# Patient Record
Sex: MALE | Employment: OTHER | URBAN - METROPOLITAN AREA
[De-identification: names, ages, dates, MRNs, and addresses within clinical notes are randomized per-mention and may not be internally consistent; named-entity substitution may affect disease eponyms.]

---

## 2022-12-21 ENCOUNTER — LAB REQUISITION (OUTPATIENT)
Dept: LAB | Facility: CLINIC | Age: 82
End: 2022-12-21

## 2022-12-21 VITALS
TEMPERATURE: 97.6 F | DIASTOLIC BLOOD PRESSURE: 80 MMHG | SYSTOLIC BLOOD PRESSURE: 126 MMHG | HEART RATE: 87 BPM | RESPIRATION RATE: 18 BRPM | OXYGEN SATURATION: 99 %

## 2022-12-21 DIAGNOSIS — Z00.01 ENCOUNTER FOR GENERAL ADULT MEDICAL EXAMINATION WITH ABNORMAL FINDINGS: ICD-10-CM

## 2022-12-21 RX ORDER — MEMANTINE HYDROCHLORIDE 5 MG/1
5 TABLET ORAL 2 TIMES DAILY
COMMUNITY

## 2022-12-21 RX ORDER — LEVOTHYROXINE SODIUM 50 UG/1
50 TABLET ORAL DAILY
COMMUNITY

## 2022-12-21 RX ORDER — METOPROLOL SUCCINATE 25 MG/1
12.5 TABLET, EXTENDED RELEASE ORAL DAILY
COMMUNITY

## 2022-12-21 RX ORDER — FAMOTIDINE 20 MG/1
20 TABLET, FILM COATED ORAL 2 TIMES DAILY
COMMUNITY

## 2022-12-21 RX ORDER — CLOPIDOGREL BISULFATE 75 MG/1
75 TABLET ORAL DAILY
COMMUNITY

## 2022-12-21 RX ORDER — ACETAMINOPHEN 325 MG/1
650 TABLET ORAL EVERY 4 HOURS PRN
COMMUNITY

## 2022-12-22 ENCOUNTER — TRANSITIONAL CARE UNIT VISIT (OUTPATIENT)
Dept: GERIATRICS | Facility: CLINIC | Age: 82
End: 2022-12-22
Payer: COMMERCIAL

## 2022-12-22 DIAGNOSIS — I48.19 PERSISTENT ATRIAL FIBRILLATION (H): ICD-10-CM

## 2022-12-22 DIAGNOSIS — I50.22 CHRONIC HFREF (HEART FAILURE WITH REDUCED EJECTION FRACTION) (H): ICD-10-CM

## 2022-12-22 DIAGNOSIS — I10 ESSENTIAL HYPERTENSION: ICD-10-CM

## 2022-12-22 DIAGNOSIS — K21.00 GASTROESOPHAGEAL REFLUX DISEASE WITH ESOPHAGITIS WITHOUT HEMORRHAGE: ICD-10-CM

## 2022-12-22 DIAGNOSIS — I65.22 STENOSIS OF LEFT CAROTID ARTERY: ICD-10-CM

## 2022-12-22 DIAGNOSIS — Z71.89 ADVANCED DIRECTIVES, COUNSELING/DISCUSSION: ICD-10-CM

## 2022-12-22 DIAGNOSIS — R53.81 PHYSICAL DECONDITIONING: ICD-10-CM

## 2022-12-22 DIAGNOSIS — E03.9 HYPOTHYROIDISM, UNSPECIFIED TYPE: ICD-10-CM

## 2022-12-22 DIAGNOSIS — N18.31 STAGE 3A CHRONIC KIDNEY DISEASE (H): ICD-10-CM

## 2022-12-22 DIAGNOSIS — I25.10 CORONARY ARTERY DISEASE INVOLVING NATIVE CORONARY ARTERY OF NATIVE HEART WITHOUT ANGINA PECTORIS: ICD-10-CM

## 2022-12-22 DIAGNOSIS — E78.5 DYSLIPIDEMIA: ICD-10-CM

## 2022-12-22 DIAGNOSIS — I63.9 CEREBROVASCULAR ACCIDENT (CVA), UNSPECIFIED MECHANISM (H): Primary | ICD-10-CM

## 2022-12-22 DIAGNOSIS — R47.01 EXPRESSIVE APHASIA: ICD-10-CM

## 2022-12-22 PROCEDURE — P9604 ONE-WAY ALLOW PRORATED TRIP: HCPCS | Performed by: NURSE PRACTITIONER

## 2022-12-22 PROCEDURE — 36415 COLL VENOUS BLD VENIPUNCTURE: CPT | Performed by: NURSE PRACTITIONER

## 2022-12-22 PROCEDURE — 99310 SBSQ NF CARE HIGH MDM 45: CPT | Performed by: NURSE PRACTITIONER

## 2022-12-22 PROCEDURE — 86481 TB AG RESPONSE T-CELL SUSP: CPT | Performed by: NURSE PRACTITIONER

## 2022-12-22 NOTE — PROGRESS NOTES
[facility] GERIATRICS    PRIMARY CARE PROVIDER AND CLINIC:  Sathish Lund PA-C, [facility] [address]  / [facility] [address]  Chief Complaint   Patient presents with     Hospital F/U      [place] Medical Record Number:  [id number]  Place of Service where encounter took place:  [facility] (TCU) [[id number]]    Luis Chong  is a 82 year old  (1940), admitted to the above facility from  [facility]. Hospital stay 12/6/22 through 12/21/22.  HPI:    82 y.o. male PMH HTN, CAD, PAF (on Coumadin), hypothyroidism, HLD hospitalized in [place] with a 4-day history of vision loss on the left side. MRI head showed an acute large area left MCA territory stroke. Also had leukocytosis and bilateral infiltrates with small left effusion. CVA, CAD, HLD, left internal carotid artery stenosis: on coumadin, Plavix and statin. PAF, recent bradycardia: on Toprol XL dose decreased to 6.25 mg daily. BRBPR felt due to hemorrhoids, HGB stable, resolved. Syncope 12/1 felt due to prthostatic hypotension. 12/4 EF 35-40%. Using wraps to legs. HFrEF not on diuretics. Hypothyroid on synthroid. Knee pain, gout: completed steroids, did not tolerate allopurinol due to diarrhea. CKD3 stable. Skin breakdown buttocks, scrotum treated with barrier cream. Impaired cognition started Namenda. Ongoing aphasia and apraxia noted. Insomnia on melatonin. To TCU for rehab.     Patient seen for initial TCU visit. Asks to be full code. Denies headaches, dizziness, chest pain, dyspnea, bowel or bladder issues. BP range 117-134/75-80 and sats 97% since admission.     CODE STATUS/ADVANCE DIRECTIVES DISCUSSION:  Prior  CPR/Full code   ALLERGIES: No Known Allergies   PAST MEDICAL HISTORY:   Past Medical History:   Diagnosis Date     Coronary artery disease      DJD (degenerative joint disease)     left ankle     Heart attack      Hypertension       PAST SURGICAL HISTORY:   has a past surgical history that includes orthopedic surgery  (Left); Arthrodesis ankle (Right, 12/8/2014); Open reduction internal fixation rodding intramedullary tibia (Right, 6/30/2015); Remove hardware ankle (Right, 7/9/2015); Open reduction internal fixation tibia (Right, 7/9/2015); and Remove External Fixator Lower Extremity (Right, 7/9/2015).  FAMILY HISTORY: family history is not on file.  SOCIAL HISTORY:   reports that he quit smoking about 42 years ago. He has never used smokeless tobacco. He reports current alcohol use. He reports that he does not use drugs.  Patient's living condition: lives alone    Post Discharge Medication Reconciliation Status:   MED REC REQUIRED  Post Medication Reconciliation Status:  Discharge medications reconciled and changed, see notes/orders         Current Outpatient Medications   Medication Sig     acetaminophen (TYLENOL) 325 MG tablet Take 650 mg by mouth every 4 hours as needed for mild pain     atorvastatin (LIPITOR) 40 MG tablet Take 40 mg by mouth every evening     clopidogrel (PLAVIX) 75 MG tablet Take 75 mg by mouth daily     famotidine (PEPCID) 20 MG tablet Take 20 mg by mouth 2 times daily     levothyroxine (SYNTHROID/LEVOTHROID) 50 MCG tablet Take 50 mcg by mouth daily     memantine (NAMENDA) 5 MG tablet Take 5 mg by mouth 2 times daily     metoprolol succinate ER (TOPROL XL) 25 MG 24 hr tablet Take 12.5 mg by mouth daily     Warfarin Therapy Reminder Take 1 each by mouth See Admin Instructions     warfarin ANTICOAGULANT (COUMADIN) 4 MG tablet Take 4 mg by mouth daily hold for INR more than 3; recheck INR on December 30     No current facility-administered medications for this visit.       ROS:  Unobtainable secondary to aphasia. Denies headaches, dizziness, chest pain, dyspnea, bowel or bladder issues.    Vitals:  /80   Pulse 87   Temp 97.6  F (36.4  C)   Resp 18   SpO2 99%   Exam:  GENERAL APPEARANCE:  Alert, in no distress, pleasant, cooperative, oriented x self and place  EYES:  EOM, lids, pupils and irises  normal, sclera clear and conjunctiva normal, no discharge or mattering on lids or lashes noted  ENT:  Mouth normal, moist mucous membranes, nose normal without drainage or crusting, external ears without lesions, hearing acuity intact  NECK: supple, symmetrical, trachea midline  RESP:  respiratory effort normal, no chest wall tenderness, no respiratory distress, Lung sounds clear, patient is on room air  CV:  Auscultation of heart done, rate and rhythm irregularly irregular, no murmur, no rub or gallop. Edema none pitting bilateral lower extremities.   ABDOMEN:  normal bowel sounds, soft, nontender, no palpable masses.  M/S:   Gait and station not assessed, no tenderness or swelling of the joints; able to move all extremities but right side weakness noted  NEURO: cranial nerves 2-12 grossly intact, no facial asymmetry, some expressive aphasia but able to follow directions, moves all extremities but right side weaker  PSYCH:  insight and judgement and memory appear at baseline, affect and mood normal     Lab/Diagnostic data:  12/16 Cr 1.16, Hgb 12.1  12/19 Na 136, K 4.8, BUN 31, Cr 1.39    ASSESSMENT/PLAN:  Left MCA CVA  Expressive aphasia  Left internal carotid artery stenosis  Persistent atrial fibrillation  CAD  Essential hypertension  Dyslipidemia  Chronic HFrEF   Acute on chronic. Continue memantine 5 mg twice daily, Plavix 75 mg daily, atorvastatin 40 mg daily, coumadin and INR check as ordered below, metoprolol ER 12.5 mg daily, monitor vs and wt and review ranges next visit. F/U neurology and cardiology as recommended.      CKD stage IIIa  Baseline creatinine recently 1.2-1.4. Check BMP 12/26, avoid nephrotoxins.      Hypothyroidism  Continue levothyroxine 50 mcg daily     GERD  Continue famotidine 20 mg twice daily    Physical deconditioning  Acute on chronic, continue therapies as ordered and f/u progress next visit.     Advance directive discussion  Asks to be Full Code     Orders:  1. Full Code  2. BMP on  12/26 diagnosis CKD  3. INR 2.1. Coumadin 5 mg PO daily, INR 12/26    Total unit/floor time of 36 minutes spent consisted of the following: examination of patient, reviewing the record including pertinent labs and imaging. More than 50% of this time was spent in coordination of care with the patient, nursing staff, and other healthcare providers. This time was spent on discussing the care plan including labs, discharge/safe placement, and specialty follow up. Additional specific discussion included review of code status, needed follow up labs, coumadin and INR management and monitoring, expected TCU course/routine/discharge planning and clarification of meds/orders with nursing for a total of over 19 min.      Electronically signed by:  RAMON Ag CNP

## 2022-12-23 LAB
GAMMA INTERFERON BACKGROUND BLD IA-ACNC: 0.13 IU/ML
M TB IFN-G BLD-IMP: NEGATIVE
M TB IFN-G CD4+ BCKGRND COR BLD-ACNC: 8.61 IU/ML
MITOGEN IGNF BCKGRD COR BLD-ACNC: 0 IU/ML
MITOGEN IGNF BCKGRD COR BLD-ACNC: 0.01 IU/ML
QUANTIFERON MITOGEN: 8.74 IU/ML
QUANTIFERON NIL TUBE: 0.13 IU/ML
QUANTIFERON TB1 TUBE: 0.14 IU/ML
QUANTIFERON TB2 TUBE: 0.13

## 2022-12-25 ENCOUNTER — LAB REQUISITION (OUTPATIENT)
Dept: LAB | Facility: CLINIC | Age: 82
End: 2022-12-25

## 2022-12-25 DIAGNOSIS — N18.9 CHRONIC KIDNEY DISEASE, UNSPECIFIED: ICD-10-CM

## 2022-12-26 ENCOUNTER — TELEPHONE (OUTPATIENT)
Dept: GERIATRICS | Facility: CLINIC | Age: 82
End: 2022-12-26

## 2022-12-26 LAB
ANION GAP SERPL CALCULATED.3IONS-SCNC: 7 MMOL/L (ref 3–14)
BUN SERPL-MCNC: 32 MG/DL (ref 7–30)
CALCIUM SERPL-MCNC: 9 MG/DL (ref 8.5–10.1)
CHLORIDE BLD-SCNC: 107 MMOL/L (ref 94–109)
CO2 SERPL-SCNC: 26 MMOL/L (ref 20–32)
CREAT SERPL-MCNC: 1.46 MG/DL (ref 0.52–1.04)
GFR SERPL CREATININE-BSD FRML MDRD: 36 ML/MIN/1.73M2
GLUCOSE BLD-MCNC: 99 MG/DL (ref 70–99)
POTASSIUM BLD-SCNC: 3.8 MMOL/L (ref 3.4–5.3)
SODIUM SERPL-SCNC: 140 MMOL/L (ref 133–144)

## 2022-12-26 PROCEDURE — 36415 COLL VENOUS BLD VENIPUNCTURE: CPT | Performed by: NURSE PRACTITIONER

## 2022-12-26 PROCEDURE — 80048 BASIC METABOLIC PNL TOTAL CA: CPT | Performed by: NURSE PRACTITIONER

## 2022-12-26 PROCEDURE — P9604 ONE-WAY ALLOW PRORATED TRIP: HCPCS | Performed by: NURSE PRACTITIONER

## 2022-12-27 NOTE — PROGRESS NOTES
[facility]  INITIAL VISIT NOTE  December 28, 2022    PRIMARY CARE PROVIDER AND CLINIC:  Sathish Lund [facility] [address] / [place] [address]    CHIEF COMPLAINT:  Hospital follow-up/Initial visit    HPI:    Luis Chong is a 82 year old  (1940) male [facility] an [facility].     Medical history is notable for CAD, chronic HFrEF, hypertension, dyslipidemia, atrial fibrillation, CVA, CKD, anemia, hypothyroidism, GERD, and osteoarthritis.    Summary of hospital course:  Patient was admitted to [facility] from November 30 through December 1, 2022 for left MCA stroke with residual aphasia, right hemiparesis, right neglect, and impaired cognition.  He originally presented to an outside ED in [place] with a 4-day history of vision loss on the left side.  Imaging showed mixed plaque causing likely 70% stenosis at the origin of left internal carotid artery. MRI was significant for acute large area left MCA territory stroke.  He underwent mechanical thrombectomy on November 5 followed by left carotid artery angioplasty and stenting on November 11, 2022.  While inpatient, he was treated with IV vancomycin for leukocytosis and bilateral pulmonary infiltrates, left more than right with a small left effusion.  He also underwent left knee arthrocentesis and noted to have acute gout.  He was started on warfarin for atrial fibrillation.  Once he was deemed medically stable by the primary team, he was admitted to [facility].  He arrived via medical transport from [place].  He was admitted to [facility] from December 1 through December 6, 2022 for an episode of orthostatic syncope.  CT head showed signs of evolving subacute phase ischemia in portions of left temporal, parietal, and occipital lobes.  EEG was negative for seizure activity.  Metoprolol dose was reduced for bradycardia.   Limited echo on December 4 showed estimated LV ejection fraction of 35-40% and mid to apical anterior, septal, and entire apex akinesis.  He was discharged back to [facility] on December 7 where he stayed through December 21, 2022 for ongoing acute rehab.    Patient is now admitted to this facility for medical management, nursing care, and further subacute rehab.     Of note, history was obtained from patient, facility RN, and extensive review of the chart.    Today's visit:  Patient was seen in his room, while sitting in the recliner.  He appears comfortable.  He has expressive aphasia but is able to understand questions and follow commands.  There is no report of fever, chills, chest pain, palpitation, dyspnea, nausea, vomiting, abdominal pain, or urinary symptoms.  He had a bowel movement earlier today.  His INR today is 3.      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)  Chronic HFrEF (LVEF 35-40%, per echo on December 4, 2022)  Hypertension  Dyslipidemia  Persistent atrial fibrillation  CKD stage IIIa; baseline creatinine 1.2-1.4  Chronic anemia, hemoglobin recently around 12  Hypothyroidism  GERD  Left knee gout  Osteoarthritis      Past Medical History:   Diagnosis Date     Coronary artery disease      DJD (degenerative joint disease)     left ankle     Heart attack      Hypertension        PAST SURGICAL HISTORY:   Past Surgical History:   Procedure Laterality Date     ARTHRODESIS ANKLE Right 12/8/2014    Procedure: ARTHRODESIS ANKLE;  Surgeon: Miles Quintanilla MD;  Location: [facility] OR     OPEN REDUCTION INTERNAL FIXATION  RODDING INTRAMEDULLARY TIBIA Right 2015    Procedure: OPEN REDUCTION INTERNAL FIXATION RODDING INTRAMEDULLARY TIBIA;  Surgeon: Miles Quintanilla MD;  Location: [facility] OR     OPEN REDUCTION INTERNAL FIXATION TIBIA Right 2015    Procedure: OPEN REDUCTION INTERNAL FIXATION TIBIA;  Surgeon: Miles Quintanilla MD;  Location:  OR     ORTHOPEDIC SURGERY Left     ankle fracture     REMOVE EXTERNAL FIXATOR LOWER EXTREMITY Right 2015    Procedure: REMOVE EXTERNAL FIXATOR LOWER EXTREMITY;  Surgeon: Miles Quintanilla MD;  Location: [facility] OR     REMOVE HARDWARE ANKLE Right 2015    Procedure: REMOVE HARDWARE ANKLE;  Surgeon: Miles Quintanilla MD;  Location: [facility] OR       FAMILY HISTORY:   Family history is significant for alcohol abuse in father and sister and brain tumor in mother.    SOCIAL HISTORY:  Social History     Tobacco Use     Smoking status: Former     Types: Cigarettes     Quit date: 1980     Years since quittin.5     Smokeless tobacco: Never   Substance Use Topics     Alcohol use: Yes     Comment: 2 per day       MEDICATIONS:  Current Outpatient Medications   Medication Sig Dispense Refill     acetaminophen (TYLENOL) 325 MG tablet Take 650 mg by mouth every 4 hours as needed for mild pain       atorvastatin (LIPITOR) 40 MG tablet Take 40 mg by mouth every evening       clopidogrel (PLAVIX) 75 MG tablet Take 75 mg by mouth daily       famotidine (PEPCID) 20 MG tablet Take 20 mg by mouth 2 times daily       levothyroxine (SYNTHROID/LEVOTHROID) 50 MCG tablet Take 50 mcg by mouth daily       memantine (NAMENDA) 5 MG tablet Take 5 mg by mouth 2 times daily       metoprolol succinate ER (TOPROL XL) 25 MG 24 hr tablet Take 12.5 mg by mouth daily       Warfarin Therapy Reminder Take 1 each by mouth See Admin Instructions         MED REC REQUIRED  Post Medication Reconciliation Status: medication reconcilation previously completed during another office visit        ALLERGIES:  No  "Known Allergies    ROS:  10 point ROS was attempted but was limited, given aphasia.  It was reviewed as much as possible as outlined in HPI.    PHYSICAL EXAM:  Vital signs were reviewed in the chart.  Vital Signs: /79   Pulse 72   Temp 97.9  F (36.6  C)   Resp 18   Ht 1.651 m (5' 5\")   Wt 79.2 kg (174 lb 9.6 oz)   SpO2 100%   BMI 29.05 kg/m    General: Comfortable and in no acute distress  HEENT: No conjunctival pallor, no scleral icterus or injection, moist oral mucosa  Cardiovascular: Normal S1, S2, irregularly irregular rhythm  Respiratory: Lungs clear to auscultation bilaterally  GI: Abdomen soft, non-tender, non-distended, +BS  Extremities: No significant LE edema  Neuro: Expressive aphasia, no focal weakness on gross examination  Psych: Alert and alert; normal affect  Skin: No acute rash    LABORATORY/IMAGING DATA:  All relevant labs and imaging data in [facility] and/or Care Everywhere were personally reviewed today.    Hematology profile (December 16, 2022): Hemoglobin 12.1    Chemistry profile (December 09, 2022): Sodium 136, potassium 4.8, BUN 31, creatinine 1.39, glucose 109, calcium 9.4    Creatinine (December 16, 2020): 1.16      ASSESSMENT/PLAN:  Left MCA CVA, likely cardioembolic, s/p mechanical thrombectomy on November 5, 2022,  Expressive aphasia,  Physical deconditioning.  He is currently on regular consistency diet.  There is ongoing expressive aphasia.  Plan:  Continue memantine 5 mg twice daily  Continue Plavix 75 mg daily  Continue atorvastatin 40 mg daily  Continue anticoagulation with warfarin as below  Continue PT/OT/SLP evaluation and therapy  Follow-up with neurology as directed    Left internal carotid artery stenosis, s/p angioplasty and stenting on November 11, 2022.  Plan:  Continue atorvastatin 40 mg daily  Continue Plavix 75 mg daily    Persistent atrial fibrillation.  Rate is controlled.  Patient is currently on warfarin at 4 mg daily.  INR 3.2 on December 26.  INR 3 " today.  Plan:  Continue metoprolol ER 12.5 mg daily  Continue anticoagulation with warfarin at the current dose of 4 mg daily  Recheck INR on December 30  Adjust warfarin dose for INR target of 2-3    2-vessel CAD ( of mLAD and mRCA, per angiogram in January 2014, on medical therapy),  History of apical mural thrombus (following MI in January 2014),  Essential hypertension,  Dyslipidemia.  Metoprolol dose was reduced in the hospital for bradycardia and orthostatic hypotension.  He is currently stable from a cardiac standpoint.  Plan:  Continue Plavix 75 mg daily  Continue atorvastatin 40 mg daily  Continue metoprolol ER 12.5 mg daily  Monitor blood pressure and cardiac status  Follow-up with cardiology as outpatient    Chronic HFrEF (LVEF 35-40%, per echo on December 4, 2022).  Patient currently weighs 174.6 LBS and appears euvolemic.  Plan:  Continue metoprolol ER 12.5 mg daily  Monitor weight and volume status  Follow-up with cardiology as outpatient    CKD stage IIIa.  Baseline creatinine recently 1.2-1.4.  Last creatinine was stable at 1.16 on December 16.  Plan:  Avoid NSAIDs and nephrotoxins   Monitor renal function periodically    Anemia.  Hemoglobin recently around 12.  Last hemoglobin of 12.1 and MCV of 91 on December 16.  Plan:  Monitor hemoglobin periodically    Hypothyroidism.  Last TSH of 13.98 in August 2022.  Unclear if levothyroxine dose was adjusted at that time.  Plan:  Continue levothyroxine 50 mcg daily  Check TSH on January 3    GERD.  Plan:  Continue famotidine 20 mg twice daily        Orders written by provider at facility:  TSH on January 3, DX: Hypothyroidism  CBC on January 3, DX: Anemia  BMP on January 3, DX: CKD  Continue warfarin at 4 mg daily  Recheck INR on December 30        Disclaimer: This note may contain text created using speech-recognition software and may contain unintended word substitutions.      Electronically signed by:  Sadaf Robert MD

## 2022-12-27 NOTE — TELEPHONE ENCOUNTER
[facility] Geriatrics Triage Nurse INR     Provider: RAMON Govea CNP  Facility: [facility]  Facility Type:  TCU    Caller: Kimmie  Call Back Number: [phone]  Reason for call: INR  Diagnosis/Goal: A. Fib    Todays INR: 3.2  Last INR 12/22 2.1(5mg daily)    Heparin/Lovenox:  No  Currently on ABX?: No  Other interacting medication:  OtherPlavix  Missed or refused doses: No    Verbal Order/Direction given by Provider: Warfarin 4mg daily.  Next INR 12/28/22.      Provider Giving Order:  RAMON Bateman CNP    Verbal Order given to: Kimmie Swain RN

## 2022-12-28 ENCOUNTER — TRANSITIONAL CARE UNIT VISIT (OUTPATIENT)
Dept: GERIATRICS | Facility: CLINIC | Age: 82
End: 2022-12-28
Payer: COMMERCIAL

## 2022-12-28 VITALS
WEIGHT: 174.6 LBS | TEMPERATURE: 97.9 F | RESPIRATION RATE: 18 BRPM | HEIGHT: 65 IN | SYSTOLIC BLOOD PRESSURE: 130 MMHG | DIASTOLIC BLOOD PRESSURE: 79 MMHG | HEART RATE: 72 BPM | OXYGEN SATURATION: 100 % | BODY MASS INDEX: 29.09 KG/M2

## 2022-12-28 DIAGNOSIS — N18.31 STAGE 3A CHRONIC KIDNEY DISEASE (H): ICD-10-CM

## 2022-12-28 DIAGNOSIS — I48.19 PERSISTENT ATRIAL FIBRILLATION (H): ICD-10-CM

## 2022-12-28 DIAGNOSIS — Z98.890 S/P CAROTID ENDARTERECTOMY: ICD-10-CM

## 2022-12-28 DIAGNOSIS — D64.9 ANEMIA, UNSPECIFIED TYPE: ICD-10-CM

## 2022-12-28 DIAGNOSIS — E78.5 DYSLIPIDEMIA: ICD-10-CM

## 2022-12-28 DIAGNOSIS — I10 ESSENTIAL HYPERTENSION: ICD-10-CM

## 2022-12-28 DIAGNOSIS — I50.20 HFREF (HEART FAILURE WITH REDUCED EJECTION FRACTION) (H): ICD-10-CM

## 2022-12-28 DIAGNOSIS — I25.10 CORONARY ARTERY DISEASE INVOLVING NATIVE CORONARY ARTERY OF NATIVE HEART WITHOUT ANGINA PECTORIS: ICD-10-CM

## 2022-12-28 DIAGNOSIS — Z86.73 HX OF ISCHEMIC LEFT MCA STROKE: Primary | ICD-10-CM

## 2022-12-28 DIAGNOSIS — K21.9 GASTROESOPHAGEAL REFLUX DISEASE, UNSPECIFIED WHETHER ESOPHAGITIS PRESENT: ICD-10-CM

## 2022-12-28 DIAGNOSIS — R53.81 PHYSICAL DECONDITIONING: ICD-10-CM

## 2022-12-28 DIAGNOSIS — R47.01 EXPRESSIVE APHASIA: ICD-10-CM

## 2022-12-28 DIAGNOSIS — E03.9 HYPOTHYROIDISM, UNSPECIFIED TYPE: ICD-10-CM

## 2022-12-28 PROCEDURE — 99305 1ST NF CARE MODERATE MDM 35: CPT | Performed by: INTERNAL MEDICINE

## 2022-12-28 RX ORDER — WARFARIN SODIUM 4 MG/1
5 TABLET ORAL DAILY
COMMUNITY

## 2022-12-28 NOTE — LETTER
12/28/2022        RE: Luis Chong  [address]  [place] [address]        [facility]  INITIAL VISIT NOTE  December 28, 2022    PRIMARY CARE PROVIDER AND CLINIC:  [facility] [address] / [place] [address]    CHIEF COMPLAINT:  Hospital follow-up/Initial visit    HPI:    Luis Chong is a 82 year old  (1940) male who was [facility] at [facility] on December 28, 2022 for an initial visit.     Medical history is notable for CAD, chronic HFrEF, hypertension, dyslipidemia, atrial fibrillation, CVA, CKD, anemia, hypothyroidism, GERD, and osteoarthritis.    Summary of hospital course:  Patient was admitted to [facility] from November 30 through December 1, 2022 for left MCA stroke with residual aphasia, right hemiparesis, right neglect, and impaired cognition.  He originally presented to an outside ED in [place] with a 4-day history of vision loss on the left side.  Imaging showed mixed plaque causing likely 70% stenosis at the origin of left internal carotid artery. MRI was significant for acute large area left MCA territory stroke.  He underwent mechanical thrombectomy on November 5 followed by left carotid artery angioplasty and stenting on November 11, 2022.  While inpatient, he was treated with IV vancomycin for leukocytosis and bilateral pulmonary infiltrates, left more than right with a small left effusion.  He also underwent left knee arthrocentesis and noted to have acute gout.  He was started on warfarin for atrial fibrillation.  Once he was deemed medically stable by the primary team, he was admitted to [facility].  He arrived via medical transport from [place].  He was admitted to [facility] from December 1 through December 6, 2022 for an episode of orthostatic syncope.  CT head showed signs of evolving subacute phase ischemia in portions of left temporal, parietal, and occipital  lobes.  EEG was negative for seizure activity.  Metoprolol dose was reduced for bradycardia.  Limited echo on December 4 showed estimated LV ejection fraction of 35-40% and mid to apical anterior, septal, and entire apex akinesis.  He was discharged back to [facility] on December 7 where he stayed through December 21, 2022 for ongoing acute rehab.    Patient is now admitted to this facility for medical management, nursing care, and further subacute rehab.     Of note, history was obtained from patient, facility RN, and extensive review of the chart.    Today's visit:  Patient was seen in his room, while sitting in the recliner.  He appears comfortable.  He has expressive aphasia but is able to understand questions and follow commands.  There is no report of fever, chills, chest pain, palpitation, dyspnea, nausea, vomiting, abdominal pain, or urinary symptoms.  He had a bowel movement earlier today.  His INR today is 3.      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)  Chronic HFrEF (LVEF 35-40%, per echo on December 4, 2022)  Hypertension  Dyslipidemia  Persistent atrial fibrillation  CKD stage IIIa; baseline creatinine 1.2-1.4  Chronic anemia, hemoglobin recently around 12  Hypothyroidism  GERD  Left knee gout  Osteoarthritis      Past Medical History:   Diagnosis Date     Coronary artery disease      DJD (degenerative joint disease)     left ankle     Heart attack      Hypertension        PAST SURGICAL HISTORY:   Past Surgical History:   Procedure Laterality Date     ARTHRODESIS ANKLE Right 12/8/2014    Procedure: ARTHRODESIS  ANKLE;  Surgeon: Miles Quintanilla MD;  Location: [facility] OR     OPEN REDUCTION INTERNAL FIXATION RODDING INTRAMEDULLARY TIBIA Right 2015    Procedure: OPEN REDUCTION INTERNAL FIXATION RODDING INTRAMEDULLARY TIBIA;  Surgeon: Miles Quintanilla MD;  Location: [facility] OR     OPEN REDUCTION INTERNAL FIXATION TIBIA Right 2015    Procedure: OPEN REDUCTION INTERNAL FIXATION TIBIA;  Surgeon: Miles Quintanilla MD;  Location: [facility] OR     ORTHOPEDIC SURGERY Left     ankle fracture     REMOVE EXTERNAL FIXATOR LOWER EXTREMITY Right 2015    Procedure: REMOVE EXTERNAL FIXATOR LOWER EXTREMITY;  Surgeon: Miles Quintanilla MD;  Location: [facility] OR     REMOVE HARDWARE ANKLE Right 2015    Procedure: REMOVE HARDWARE ANKLE;  Surgeon: Miles Quintanilla MD;  Location: [facility] OR       FAMILY HISTORY:   Family history is significant for alcohol abuse in father and sister and brain tumor in mother.    SOCIAL HISTORY:  Social History     Tobacco Use     Smoking status: Former     Types: Cigarettes     Quit date: 1980     Years since quittin.5     Smokeless tobacco: Never   Substance Use Topics     Alcohol use: Yes     Comment: 2 per day       MEDICATIONS:  Current Outpatient Medications   Medication Sig Dispense Refill     acetaminophen (TYLENOL) 325 MG tablet Take 650 mg by mouth every 4 hours as needed for mild pain       atorvastatin (LIPITOR) 40 MG tablet Take 40 mg by mouth every evening       clopidogrel (PLAVIX) 75 MG tablet Take 75 mg by mouth daily       famotidine (PEPCID) 20 MG tablet Take 20 mg by mouth 2 times daily       levothyroxine (SYNTHROID/LEVOTHROID) 50 MCG tablet Take 50 mcg by mouth daily       memantine (NAMENDA) 5 MG tablet Take 5 mg by mouth 2 times daily       metoprolol succinate ER (TOPROL XL) 25 MG 24 hr tablet Take 12.5 mg by mouth daily       Warfarin Therapy Reminder Take 1 each by mouth See Admin Instructions         MED REC REQUIRED  Post Medication Reconciliation Status:  "medication reconcilation previously completed during another office visit        ALLERGIES:  No Known Allergies    ROS:  10 point ROS were negative other than the symptoms noted above in the HPI.    PHYSICAL EXAM:  Vital signs were reviewed in the chart.  Vital Signs: /79   Pulse 72   Temp 97.9  F (36.6  C)   Resp 18   Ht 1.651 m (5' 5\")   Wt 79.2 kg (174 lb 9.6 oz)   SpO2 100%   BMI 29.05 kg/m    General: Comfortable and in no acute distress  HEENT: No conjunctival pallor, no scleral icterus or injection, moist oral mucosa  Cardiovascular: Normal S1, S2, irregularly irregular rhythm  Respiratory: Lungs clear to auscultation bilaterally  GI: Abdomen soft, non-tender, non-distended, +BS  Extremities: No significant LE edema  Neuro: Expressive aphasia, no focal weakness on gross examination  Psych: Alert and alert; normal affect  Skin: No acute rash    LABORATORY/IMAGING DATA:  All relevant labs and imaging data in [facility] and/or Care Everywhere were personally reviewed today.    Hematology profile (December 16, 2022): Hemoglobin 12.1    Chemistry profile (December 09, 2022): Sodium 136, potassium 4.8, BUN 31, creatinine 1.39, glucose 109, calcium 9.4    Creatinine (December 16, 2020): 1.16      ASSESSMENT/PLAN:  Left MCA CVA, likely cardioembolic, s/p mechanical thrombectomy on November 5, 2022,  Expressive aphasia,  Physical deconditioning.  He is currently on regular consistency diet.  There is ongoing expressive aphasia.  Plan:  Continue memantine 5 mg twice daily  Continue Plavix 75 mg daily  Continue atorvastatin 40 mg daily  Continue anticoagulation with warfarin as below  Continue PT/OT/SLP evaluation and therapy  Follow-up with neurology as directed    Left internal carotid artery stenosis, s/p angioplasty and stenting on November 11, 2022.  Plan:  Continue atorvastatin 40 mg daily  Continue Plavix 75 mg daily    Persistent atrial fibrillation.  Rate is controlled.  Patient is currently on warfarin " at 4 mg daily.  INR 3.2 on December 26.  INR 3 today.  Plan:  Continue metoprolol ER 12.5 mg daily  Continue anticoagulation with warfarin at the current dose of 4 mg daily  Recheck INR on December 30  Adjust warfarin dose for INR target of 2-3    2-vessel CAD ( of mLAD and mRCA, per angiogram in January 2014, on medical therapy),  History of apical mural thrombus (following MI in January 2014),  Essential hypertension,  Dyslipidemia.  Metoprolol dose was reduced in the hospital for bradycardia and orthostatic hypotension.  He is currently stable from a cardiac standpoint.  Plan:  Continue Plavix 75 mg daily  Continue atorvastatin 40 mg daily  Continue metoprolol ER 12.5 mg daily  Monitor blood pressure and cardiac status  Follow-up with cardiology as outpatient    Chronic HFrEF (LVEF 35-40%, per echo on December 4, 2022).  Patient currently weighs 174.6 LBS and appears euvolemic.  Plan:  Continue metoprolol ER 12.5 mg daily  Monitor weight and volume status  Follow-up with cardiology as outpatient    CKD stage IIIa.  Baseline creatinine recently 1.2-1.4.  Last creatinine was stable at 1.16 on December 16.  Plan:  Avoid NSAIDs and nephrotoxins   Monitor renal function periodically    Anemia.  Hemoglobin recently around 12.  Last hemoglobin of 12.1 and MCV of 91 on December 16.  Plan:  Monitor hemoglobin periodically    Hypothyroidism.  Last TSH of 13.98 in August 2022.  Unclear if levothyroxine dose was adjusted at that time.  Plan:  Continue levothyroxine 50 mcg daily  Check TSH on January 3    GERD.  Plan:  Continue famotidine 20 mg twice daily        Orders written by provider at facility:  TSH on January 3, DX: Hypothyroidism  CBC on January 3, DX: Anemia  BMP on January 3, DX: CKD  Continue warfarin at 4 mg daily  Recheck INR on December 30        Disclaimer: This note may contain text created using speech-recognition software and may contain unintended word substitutions.      Electronically signed  by:  Sadaf Robert MD                          Sincerely,        Sadaf Robert MD

## 2023-01-02 ENCOUNTER — TRANSITIONAL CARE UNIT VISIT (OUTPATIENT)
Dept: GERIATRICS | Facility: CLINIC | Age: 83
End: 2023-01-02
Payer: COMMERCIAL

## 2023-01-02 ENCOUNTER — LAB REQUISITION (OUTPATIENT)
Dept: LAB | Facility: CLINIC | Age: 83
End: 2023-01-02

## 2023-01-02 VITALS
OXYGEN SATURATION: 97 % | HEART RATE: 88 BPM | DIASTOLIC BLOOD PRESSURE: 80 MMHG | TEMPERATURE: 97.7 F | WEIGHT: 177 LBS | SYSTOLIC BLOOD PRESSURE: 122 MMHG | BODY MASS INDEX: 29.45 KG/M2 | RESPIRATION RATE: 18 BRPM

## 2023-01-02 DIAGNOSIS — E03.9 HYPOTHYROIDISM, UNSPECIFIED TYPE: ICD-10-CM

## 2023-01-02 DIAGNOSIS — R53.81 PHYSICAL DECONDITIONING: ICD-10-CM

## 2023-01-02 DIAGNOSIS — I63.9 CEREBROVASCULAR ACCIDENT (CVA), UNSPECIFIED MECHANISM (H): Primary | ICD-10-CM

## 2023-01-02 DIAGNOSIS — R47.01 EXPRESSIVE APHASIA: ICD-10-CM

## 2023-01-02 DIAGNOSIS — E78.5 DYSLIPIDEMIA: ICD-10-CM

## 2023-01-02 DIAGNOSIS — I10 ESSENTIAL HYPERTENSION: ICD-10-CM

## 2023-01-02 DIAGNOSIS — I25.10 CORONARY ARTERY DISEASE INVOLVING NATIVE CORONARY ARTERY OF NATIVE HEART WITHOUT ANGINA PECTORIS: ICD-10-CM

## 2023-01-02 DIAGNOSIS — N18.31 STAGE 3A CHRONIC KIDNEY DISEASE (H): ICD-10-CM

## 2023-01-02 DIAGNOSIS — N18.9 CHRONIC KIDNEY DISEASE, UNSPECIFIED: ICD-10-CM

## 2023-01-02 DIAGNOSIS — K21.9 GASTROESOPHAGEAL REFLUX DISEASE, UNSPECIFIED WHETHER ESOPHAGITIS PRESENT: ICD-10-CM

## 2023-01-02 DIAGNOSIS — I50.20 HFREF (HEART FAILURE WITH REDUCED EJECTION FRACTION) (H): ICD-10-CM

## 2023-01-02 DIAGNOSIS — D64.9 ANEMIA, UNSPECIFIED TYPE: ICD-10-CM

## 2023-01-02 DIAGNOSIS — I65.22 STENOSIS OF LEFT CAROTID ARTERY: ICD-10-CM

## 2023-01-02 DIAGNOSIS — D64.9 ANEMIA, UNSPECIFIED: ICD-10-CM

## 2023-01-02 DIAGNOSIS — I48.19 PERSISTENT ATRIAL FIBRILLATION (H): ICD-10-CM

## 2023-01-02 PROCEDURE — 99309 SBSQ NF CARE MODERATE MDM 30: CPT | Performed by: NURSE PRACTITIONER

## 2023-01-02 NOTE — PROGRESS NOTES
[facility]    Chief Complaint   Patient presents with     RECHECK     HPI:  Luis Chong is a 82 year old  (1940), who is being seen today for an episodic care visit at: [facility] (TCU) [[id number]].     Per recent TCU provider progress notes:   82 y.o. male PMH HTN, CAD, PAF (on Coumadin), hypothyroidism, HLD hospitalized in [place] with a 4-day history of vision loss on the left side. MRI head showed an acute large area left MCA territory stroke. Also had leukocytosis and bilateral infiltrates with small left effusion. CVA, CAD, HLD, left internal carotid artery stenosis: on coumadin, Plavix and statin. PAF, recent bradycardia: on Toprol XL dose decreased to 6.25 mg daily. BRBPR felt due to hemorrhoids, HGB stable, resolved. Syncope 12/1 felt due to orthostatic hypotension. 12/4 EF 35-40%. Using wraps to legs. HFrEF not on diuretics. Hypothyroid on synthroid. Knee pain, gout: completed steroids, did not tolerate allopurinol due to diarrhea. CKD3 stable. Skin breakdown buttocks, scrotum treated with barrier cream. Impaired cognition started Namenda. Ongoing aphasia and apraxia noted. Insomnia on melatonin. To TCU for rehab.     Today's concern is: seen for f/u mobility, INR results, overall status. No new complaints. Walks 500 ft with no assistive device. BP range 118-135/73-80 and sats 97% room air.     Allergies, and PMH/PSH reviewed in [facility] today.  REVIEW OF SYSTEMS:  Deferred - working with therapies.     Objective:   /80   Pulse 88   Temp 97.7  F (36.5  C)   Resp 18   Wt 80.3 kg (177 lb)   SpO2 97%   BMI 29.45 kg/m    GENERAL APPEARANCE:  Alert, in no distress, cooperative  ENT:  Mouth normal, moist mucous membranes, normal hearing acuity  EYES:  Conjunctiva and lids normal  RESP:  no respiratory distress, on room air  CV: no LE edema  NEURO:   No facial asymmetry, expressive aphasia  PSYCH:  affect and mood normal     12/26 Na 140, K 3.8, Cr 1.46 and BUN  32    Assessment/Plan:  Left MCA CVA  Expressive aphasia  Left internal carotid artery stenosis  Persistent atrial fibrillation  CAD  Essential hypertension  Dyslipidemia  Chronic HFrEF   Acute on chronic. Continue memantine 5 mg twice daily, Plavix 75 mg daily, atorvastatin 40 mg daily, coumadin and INR check as ordered below, metoprolol ER 12.5 mg daily, monitor vs and wt and review ranges next visit. F/U neurology and cardiology as recommended.      CKD stage IIIa  Baseline creatinine recently 1.2-1.4. On 12/26 Cr 1.46. Check BMP 1/3, avoid nephrotoxins.     Anemia  Note Hgb 12.1 on 12/16. Check CBC on 1/3 and f/u results.       Hypothyroidism  Continue levothyroxine 50 mcg daily     GERD  Continue famotidine 20 mg twice daily    Physical deconditioning  Acute on chronic, continue therapies as ordered and f/u progress next visit.     MED REC REQUIRED  Post Medication Reconciliation Status:  Discharge medications reconciled, continue medications without change    Orders:  1. INR 2.3 on 12/30 Coumadin 4 mg PO daily, INR 1/6  2. CBC and BMP check 1/3 diagnosis anemia, CKD    Electronically signed by: RAMON Ag CNP

## 2023-01-03 LAB
ANION GAP SERPL CALCULATED.3IONS-SCNC: 8 MMOL/L (ref 3–14)
BUN SERPL-MCNC: 22 MG/DL (ref 7–30)
CALCIUM SERPL-MCNC: 9.2 MG/DL (ref 8.5–10.1)
CHLORIDE BLD-SCNC: 106 MMOL/L (ref 94–109)
CO2 SERPL-SCNC: 28 MMOL/L (ref 20–32)
CREAT SERPL-MCNC: 1.31 MG/DL (ref 0.52–1.04)
ERYTHROCYTE [DISTWIDTH] IN BLOOD BY AUTOMATED COUNT: 15.4 % (ref 10–15)
GFR SERPL CREATININE-BSD FRML MDRD: 41 ML/MIN/1.73M2
GLUCOSE BLD-MCNC: 91 MG/DL (ref 70–99)
HCT VFR BLD AUTO: 38 % (ref 35–47)
HGB BLD-MCNC: 12.4 G/DL (ref 11.7–15.7)
MCH RBC QN AUTO: 30 PG (ref 26.5–33)
MCHC RBC AUTO-ENTMCNC: 32.6 G/DL (ref 31.5–36.5)
MCV RBC AUTO: 92 FL (ref 78–100)
PLATELET # BLD AUTO: 244 10E3/UL (ref 150–450)
POTASSIUM BLD-SCNC: 3.8 MMOL/L (ref 3.4–5.3)
RBC # BLD AUTO: 4.13 10E6/UL (ref 3.8–5.2)
SODIUM SERPL-SCNC: 142 MMOL/L (ref 133–144)
WBC # BLD AUTO: 7.2 10E3/UL (ref 4–11)

## 2023-01-03 PROCEDURE — 80048 BASIC METABOLIC PNL TOTAL CA: CPT | Performed by: NURSE PRACTITIONER

## 2023-01-03 PROCEDURE — P9604 ONE-WAY ALLOW PRORATED TRIP: HCPCS | Performed by: NURSE PRACTITIONER

## 2023-01-03 PROCEDURE — 36415 COLL VENOUS BLD VENIPUNCTURE: CPT | Performed by: NURSE PRACTITIONER

## 2023-01-03 PROCEDURE — 85014 HEMATOCRIT: CPT | Performed by: NURSE PRACTITIONER

## 2023-01-09 VITALS
BODY MASS INDEX: 29.49 KG/M2 | RESPIRATION RATE: 18 BRPM | OXYGEN SATURATION: 96 % | DIASTOLIC BLOOD PRESSURE: 83 MMHG | HEART RATE: 73 BPM | SYSTOLIC BLOOD PRESSURE: 163 MMHG | TEMPERATURE: 98.1 F | WEIGHT: 177.2 LBS

## 2023-01-10 ENCOUNTER — DISCHARGE SUMMARY NURSING HOME (OUTPATIENT)
Dept: GERIATRICS | Facility: CLINIC | Age: 83
End: 2023-01-10
Payer: COMMERCIAL

## 2023-01-10 DIAGNOSIS — I48.19 PERSISTENT ATRIAL FIBRILLATION (H): ICD-10-CM

## 2023-01-10 DIAGNOSIS — E03.9 HYPOTHYROIDISM, UNSPECIFIED TYPE: ICD-10-CM

## 2023-01-10 DIAGNOSIS — I10 ESSENTIAL HYPERTENSION: ICD-10-CM

## 2023-01-10 DIAGNOSIS — I25.10 CORONARY ARTERY DISEASE INVOLVING NATIVE CORONARY ARTERY OF NATIVE HEART WITHOUT ANGINA PECTORIS: ICD-10-CM

## 2023-01-10 DIAGNOSIS — K21.9 GASTROESOPHAGEAL REFLUX DISEASE, UNSPECIFIED WHETHER ESOPHAGITIS PRESENT: ICD-10-CM

## 2023-01-10 DIAGNOSIS — D64.9 ANEMIA, UNSPECIFIED TYPE: ICD-10-CM

## 2023-01-10 DIAGNOSIS — I65.22 STENOSIS OF LEFT CAROTID ARTERY: ICD-10-CM

## 2023-01-10 DIAGNOSIS — I50.20 HFREF (HEART FAILURE WITH REDUCED EJECTION FRACTION) (H): ICD-10-CM

## 2023-01-10 DIAGNOSIS — E78.5 DYSLIPIDEMIA: ICD-10-CM

## 2023-01-10 DIAGNOSIS — R47.01 EXPRESSIVE APHASIA: ICD-10-CM

## 2023-01-10 DIAGNOSIS — N18.31 STAGE 3A CHRONIC KIDNEY DISEASE (H): ICD-10-CM

## 2023-01-10 DIAGNOSIS — I63.9 CEREBROVASCULAR ACCIDENT (CVA), UNSPECIFIED MECHANISM (H): Primary | ICD-10-CM

## 2023-01-10 DIAGNOSIS — R53.81 PHYSICAL DECONDITIONING: ICD-10-CM

## 2023-01-10 PROCEDURE — 99316 NF DSCHRG MGMT 30 MIN+: CPT | Performed by: NURSE PRACTITIONER

## 2023-01-10 NOTE — PROGRESS NOTES
[facility] GERIATRICS DISCHARGE SUMMARY  PATIENT'S NAME: Luis Chong  YOB: 1940  MEDICAL RECORD NUMBER:  [id number]  Place of Service where encounter took place:  [facility] (TCU) [[id number]]    PRIMARY CARE PROVIDER AND CLINIC RESPONSIBLE AFTER TRANSFER:   Sathish Ludn PA-C, [facility] [address] / [facility] [address]    Non-FMG Provider     Transferring providers: RAMON Ag CNP, Dr. Jakob MD  Recent Hospitalization/ED:  [facility] stay 12/6/22 to 12/21/22.  Date of SNF Admission: 12/21/22  Date of SNF (anticipated) Discharge: 1/11/23  Discharged to: new assisted living for patient   Cognitive Scores: not available  Physical Function: Ambulating 150 ft with SBA  DME: No new DME needed    CODE STATUS/ADVANCE DIRECTIVES DISCUSSION:  Prior   ALLERGIES: Patient has no known allergies.    NURSING FACILITY COURSE   Medication Changes/Rationale:     None    Per recent TCU provider progress notes:   82 y.o. male PMH HTN, CAD, PAF (on Coumadin), hypothyroidism, HLD hospitalized in [place] with a 4-day history of vision loss on the left side. MRI head showed an acute large area left MCA territory stroke. Also had leukocytosis and bilateral infiltrates with small left effusion. CVA, CAD, HLD, left internal carotid artery stenosis: on coumadin, Plavix and statin. PAF, recent bradycardia: on Toprol XL dose decreased to 6.25 mg daily. BRBPR felt due to hemorrhoids, HGB stable, resolved. Syncope 12/1 felt due to orthostatic hypotension. 12/4 EF 35-40%. Using wraps to legs. HFrEF not on diuretics. Hypothyroid on synthroid. Knee pain, gout: completed steroids, did not tolerate allopurinol due to diarrhea. CKD3 stable. Skin breakdown buttocks, scrotum treated with barrier cream. Impaired cognition started Namenda. Ongoing aphasia and apraxia noted. Insomnia on melatonin. To TCU for rehab.     Seen for discharge visit. No new concerns. Wt up 6 lbs since  admission. BP range 119-163/76-97 and sats 95% room air. To [facility] with home care as recommended below.     Summary of nursing facility stay:   Left MCA CVA  Expressive aphasia  Left internal carotid artery stenosis  Persistent atrial fibrillation  CAD  Essential hypertension  Dyslipidemia  Chronic HFrEF   Acute on chronic. Continue memantine 5 mg twice daily, Plavix 75 mg daily, atorvastatin 40 mg daily, coumadin and INR check as ordered, metoprolol ER 12.5 mg daily, monitor vs and wt and review ranges next PCP visit. F/U neurology and cardiology as recommended.      CKD stage IIIa  Baseline creatinine recently 1.2-1.4. On 12/26 Cr 1.46. Check BMP 1/3 showed Cr 1.31, avoid nephrotoxins.     Anemia  Note Hgb 12.1 on 12/16. Check CBC on 1/3 showed Hgb 12.4.        Hypothyroidism  Continue levothyroxine 50 mcg daily     GERD  Continue famotidine 20 mg twice daily    Physical deconditioning  Acute on chronic, discharge to [facility] with home care as below.     Discharge Medications:  MED REC REQUIRED  Post Medication Reconciliation Status:  Discharge medications reconciled and changed, see notes/orders      Current Outpatient Medications   Medication Sig Dispense Refill     acetaminophen (TYLENOL) 325 MG tablet Take 650 mg by mouth every 4 hours as needed for mild pain       atorvastatin (LIPITOR) 40 MG tablet Take 40 mg by mouth every evening       clopidogrel (PLAVIX) 75 MG tablet Take 75 mg by mouth daily       famotidine (PEPCID) 20 MG tablet Take 20 mg by mouth 2 times daily       levothyroxine (SYNTHROID/LEVOTHROID) 50 MCG tablet Take 50 mcg by mouth daily       memantine (NAMENDA) 5 MG tablet Take 5 mg by mouth 2 times daily       metoprolol succinate ER (TOPROL XL) 25 MG 24 hr tablet Take 12.5 mg by mouth daily       warfarin ANTICOAGULANT (COUMADIN) 4 MG tablet Take 5 mg by mouth daily INR 1/11       Warfarin Therapy Reminder Take 1 each by mouth See Admin Instructions          Controlled medications:   not  applicable/none     Past Medical History:   Past Medical History:   Diagnosis Date     Coronary artery disease      DJD (degenerative joint disease)     left ankle     Heart attack (H)      Hypertension      Physical Exam:   Vitals: BP (!) 163/83   Pulse 73   Temp 98.1  F (36.7  C)   Resp 18   Wt 80.4 kg (177 lb 3.2 oz)   SpO2 96%   BMI 29.49 kg/m    BMI: Body mass index is 29.49 kg/m .  GENERAL APPEARANCE:  Alert, in no distress, cooperative  ENT:  Mouth normal, moist mucous membranes, normal hearing acuity  EYES:  Conjunctiva and lids normal  RESP:  no respiratory distress, on room air, LSC  CV: no LE edema. HRR  NEURO:   No facial asymmetry, expressive aphasia  PSYCH:  affect and mood normal     SNF labs:   1/3/23  WBC 7.2, HGB 12.4,   Na 142, K 3.8, BUN 22 and Cr 1.31    DISCHARGE PLAN:    Follow up labs: INR 1/11 to be addressed by TCU provider, further management per PCP    Medical Follow Up:      Follow up with primary care provider in 2-3 weeks  Follow up with specialist neurology, cardiology as recommended     Current [place] scheduled appointments:   No future appointments.     Discharge Services: Home Care:  Occupational Therapy, Physical Therapy and ST From:  [facility]    Discharge Instructions Verbalized to Patient at Discharge:     None    TOTAL DISCHARGE TIME:   Greater than 30 minutes  Electronically signed by:  RAMON Ag CNP     Documentation of Face to Face and Certification for Home Health Services    I certify that services are/were furnished while this patient was under the care of a physician and that a physician or an allowed non-physician practitioner (NPP), had a face-to-face encounter that meets the physician face-to-face encounter requirements. The encounter was in whole, or in part, related to the primary reason for home health. The patient is confined to his/her home and needs intermittent skilled nursing, physical therapy, speech-language  pathology, or the continued need for occupational therapy. A plan of care has been established by a physician and is periodically reviewed by a physician.  Date of Face-to-Face Encounter: 1/10/2023.    I certify that, based on my findings, the following services are medically necessary home health services: Occupational Therapy, Physical Therapy and Speech Language Therapy.    My clinical findings support the need for the above skilled services because: Requires assistance of another person or specialized equipment to access medical services because patient: Is unable to exit home safely on own due to: weakness, aphasia...    Patient to re-establish plan of care with their PCP within 7-10 days after leaving the facility to reestablish care.  Medicare certified PEC[employer] provider: RAMON Ag CNP  Date: January 10, 2023

## 2023-01-13 ENCOUNTER — LAB REQUISITION (OUTPATIENT)
Dept: LAB | Facility: CLINIC | Age: 83
End: 2023-01-13
Payer: COMMERCIAL

## 2023-01-13 DIAGNOSIS — Z79.01 LONG TERM (CURRENT) USE OF ANTICOAGULANTS: ICD-10-CM

## 2023-01-16 LAB — INR PPP: 2.2 (ref 0.85–1.15)

## 2023-01-16 PROCEDURE — 85610 PROTHROMBIN TIME: CPT | Mod: ORL | Performed by: NURSE PRACTITIONER

## 2023-01-16 PROCEDURE — P9603 ONE-WAY ALLOW PRORATED MILES: HCPCS | Mod: ORL | Performed by: NURSE PRACTITIONER

## 2023-01-16 PROCEDURE — 36415 COLL VENOUS BLD VENIPUNCTURE: CPT | Mod: ORL | Performed by: NURSE PRACTITIONER

## 2023-01-27 ENCOUNTER — LAB REQUISITION (OUTPATIENT)
Dept: LAB | Facility: CLINIC | Age: 83
End: 2023-01-27
Payer: COMMERCIAL

## 2023-01-27 DIAGNOSIS — R79.1 ABNORMAL COAGULATION PROFILE: ICD-10-CM

## 2023-01-31 ENCOUNTER — LAB REQUISITION (OUTPATIENT)
Dept: LAB | Facility: CLINIC | Age: 83
End: 2023-01-31
Payer: COMMERCIAL

## 2023-01-31 DIAGNOSIS — R79.1 ABNORMAL COAGULATION PROFILE: ICD-10-CM

## 2023-01-31 LAB — INR PPP: 1.92 (ref 0.85–1.15)

## 2023-01-31 PROCEDURE — 36415 COLL VENOUS BLD VENIPUNCTURE: CPT | Mod: ORL | Performed by: PHYSICIAN ASSISTANT

## 2023-01-31 PROCEDURE — 85610 PROTHROMBIN TIME: CPT | Mod: ORL | Performed by: PHYSICIAN ASSISTANT

## 2023-01-31 PROCEDURE — P9603 ONE-WAY ALLOW PRORATED MILES: HCPCS | Mod: ORL | Performed by: PHYSICIAN ASSISTANT

## 2023-02-10 ENCOUNTER — LAB REQUISITION (OUTPATIENT)
Dept: LAB | Facility: CLINIC | Age: 83
End: 2023-02-10
Payer: COMMERCIAL

## 2023-02-10 DIAGNOSIS — R79.1 ABNORMAL COAGULATION PROFILE: ICD-10-CM

## 2023-02-13 LAB — INR PPP: 1.56 (ref 0.85–1.15)

## 2023-02-13 PROCEDURE — 85610 PROTHROMBIN TIME: CPT | Mod: ORL | Performed by: PHYSICIAN ASSISTANT

## 2023-02-13 PROCEDURE — P9603 ONE-WAY ALLOW PRORATED MILES: HCPCS | Mod: ORL | Performed by: PHYSICIAN ASSISTANT

## 2023-02-13 PROCEDURE — 36415 COLL VENOUS BLD VENIPUNCTURE: CPT | Mod: ORL | Performed by: PHYSICIAN ASSISTANT

## 2023-02-27 ENCOUNTER — LAB REQUISITION (OUTPATIENT)
Dept: LAB | Facility: CLINIC | Age: 83
End: 2023-02-27
Payer: COMMERCIAL

## 2023-02-27 DIAGNOSIS — R79.1 ABNORMAL COAGULATION PROFILE: ICD-10-CM

## 2023-02-27 LAB — INR PPP: 1.67 (ref 0.85–1.15)

## 2023-02-27 PROCEDURE — 36415 COLL VENOUS BLD VENIPUNCTURE: CPT | Mod: ORL | Performed by: PHYSICIAN ASSISTANT

## 2023-02-27 PROCEDURE — 85610 PROTHROMBIN TIME: CPT | Mod: ORL | Performed by: PHYSICIAN ASSISTANT

## 2023-02-27 PROCEDURE — P9603 ONE-WAY ALLOW PRORATED MILES: HCPCS | Mod: ORL | Performed by: PHYSICIAN ASSISTANT

## 2023-03-02 ENCOUNTER — LAB REQUISITION (OUTPATIENT)
Dept: LAB | Facility: CLINIC | Age: 83
End: 2023-03-02
Payer: COMMERCIAL

## 2023-03-02 DIAGNOSIS — R79.1 ABNORMAL COAGULATION PROFILE: ICD-10-CM

## 2023-03-06 ENCOUNTER — LAB REQUISITION (OUTPATIENT)
Dept: LAB | Facility: CLINIC | Age: 83
End: 2023-03-06
Payer: COMMERCIAL

## 2023-03-06 DIAGNOSIS — R79.1 ABNORMAL COAGULATION PROFILE: ICD-10-CM

## 2023-03-07 LAB — INR PPP: 2.31 (ref 0.85–1.15)

## 2023-03-07 PROCEDURE — 36415 COLL VENOUS BLD VENIPUNCTURE: CPT | Mod: ORL | Performed by: PHYSICIAN ASSISTANT

## 2023-03-07 PROCEDURE — P9603 ONE-WAY ALLOW PRORATED MILES: HCPCS | Mod: ORL | Performed by: PHYSICIAN ASSISTANT

## 2023-03-07 PROCEDURE — 85610 PROTHROMBIN TIME: CPT | Mod: ORL | Performed by: PHYSICIAN ASSISTANT

## 2023-03-16 ENCOUNTER — LAB REQUISITION (OUTPATIENT)
Dept: LAB | Facility: CLINIC | Age: 83
End: 2023-03-16
Payer: COMMERCIAL

## 2023-03-16 DIAGNOSIS — I25.10 ATHEROSCLEROTIC HEART DISEASE OF NATIVE CORONARY ARTERY WITHOUT ANGINA PECTORIS: ICD-10-CM

## 2023-03-20 LAB — INR PPP: 1.76 (ref 0.85–1.15)

## 2023-03-20 PROCEDURE — 36415 COLL VENOUS BLD VENIPUNCTURE: CPT | Mod: ORL | Performed by: PHYSICIAN ASSISTANT

## 2023-03-20 PROCEDURE — P9603 ONE-WAY ALLOW PRORATED MILES: HCPCS | Mod: ORL | Performed by: PHYSICIAN ASSISTANT

## 2023-03-20 PROCEDURE — 85610 PROTHROMBIN TIME: CPT | Mod: ORL | Performed by: PHYSICIAN ASSISTANT

## 2023-04-09 ENCOUNTER — HEALTH MAINTENANCE LETTER (OUTPATIENT)
Age: 83
End: 2023-04-09

## 2024-06-15 ENCOUNTER — HEALTH MAINTENANCE LETTER (OUTPATIENT)
Age: 84
End: 2024-06-15

## 2025-07-06 ENCOUNTER — HEALTH MAINTENANCE LETTER (OUTPATIENT)
Age: 85
End: 2025-07-06